# Patient Record
Sex: FEMALE | Race: WHITE | NOT HISPANIC OR LATINO
[De-identification: names, ages, dates, MRNs, and addresses within clinical notes are randomized per-mention and may not be internally consistent; named-entity substitution may affect disease eponyms.]

---

## 2021-02-10 ENCOUNTER — APPOINTMENT (OUTPATIENT)
Dept: PLASTIC SURGERY | Facility: CLINIC | Age: 81
End: 2021-02-10
Payer: MEDICARE

## 2021-02-10 VITALS
HEIGHT: 55 IN | HEART RATE: 64 BPM | TEMPERATURE: 97.9 F | WEIGHT: 128 LBS | SYSTOLIC BLOOD PRESSURE: 140 MMHG | OXYGEN SATURATION: 99 % | DIASTOLIC BLOOD PRESSURE: 90 MMHG | RESPIRATION RATE: 20 BRPM | BODY MASS INDEX: 29.62 KG/M2

## 2021-02-10 DIAGNOSIS — L82.1 OTHER SEBORRHEIC KERATOSIS: ICD-10-CM

## 2021-02-10 DIAGNOSIS — Z87.09 PERSONAL HISTORY OF OTHER DISEASES OF THE RESPIRATORY SYSTEM: ICD-10-CM

## 2021-02-10 DIAGNOSIS — Z86.79 PERSONAL HISTORY OF OTHER DISEASES OF THE CIRCULATORY SYSTEM: ICD-10-CM

## 2021-02-10 DIAGNOSIS — Z78.9 OTHER SPECIFIED HEALTH STATUS: ICD-10-CM

## 2021-02-10 DIAGNOSIS — Z87.891 PERSONAL HISTORY OF NICOTINE DEPENDENCE: ICD-10-CM

## 2021-02-10 PROBLEM — Z00.00 ENCOUNTER FOR PREVENTIVE HEALTH EXAMINATION: Status: ACTIVE | Noted: 2021-02-10

## 2021-02-10 PROCEDURE — 99203 OFFICE O/P NEW LOW 30 MIN: CPT

## 2021-02-10 PROCEDURE — 99072 ADDL SUPL MATRL&STAF TM PHE: CPT

## 2021-02-10 RX ORDER — BUDESONIDE AND FORMOTEROL FUMARATE DIHYDRATE 80; 4.5 UG/1; UG/1
80-4.5 AEROSOL RESPIRATORY (INHALATION)
Refills: 0 | Status: ACTIVE | COMMUNITY

## 2021-02-10 RX ORDER — APIXABAN 2.5 MG/1
2.5 TABLET, FILM COATED ORAL
Refills: 0 | Status: ACTIVE | COMMUNITY

## 2021-02-10 RX ORDER — METHOTREXATE 2.5 MG/1
2.5 TABLET ORAL
Refills: 0 | Status: ACTIVE | COMMUNITY

## 2021-02-10 NOTE — HISTORY OF PRESENT ILLNESS
[FreeTextEntry1] : Patient describes a forehead mass present for a number of years but recently increased in size.  She denies any history of trauma or infection.  She has no history of skin cancer, and no known family history of same.  \par She has a history of atrial fibrillation and is on Eliquis\par She quit smoking in 2000

## 2021-02-10 NOTE — REASON FOR VISIT
[Consultation] : a consultation visit [FreeTextEntry1] : Patient referred by her dermatologist for treatment of a cystic mass on the forehead

## 2021-04-16 ENCOUNTER — APPOINTMENT (OUTPATIENT)
Dept: PLASTIC SURGERY | Facility: CLINIC | Age: 81
End: 2021-04-16
Payer: MEDICARE

## 2021-04-16 PROCEDURE — 99072 ADDL SUPL MATRL&STAF TM PHE: CPT

## 2021-04-16 PROCEDURE — 14040 TIS TRNFR F/C/C/M/N/A/G/H/F: CPT

## 2021-04-18 NOTE — REASON FOR VISIT
[Procedure: _________] : a [unfilled] procedure visit [FreeTextEntry1] : Patient returns for excisional biopsy of the cystic lesion on her forehead

## 2021-04-18 NOTE — PROCEDURE
[FreeTextEntry1] : Cystic lesion left forehead [FreeTextEntry2] : Excisional biopsy of left forehead cyst  [FreeTextEntry4] : 20 cc  [FreeTextEntry3] : Xylocaine 1% [FreeTextEntry5] : none  [FreeTextEntry6] : The lesion on the forehead was marked for excision with the patient in agreement.  FOllowing a sterile prep and drape, Xylocaine with epinephrine was injected for local anesthesia.  Incision was made as marked and the lesion was excised.  Hemostasis was assured, and then flaps were undermined, advanced, and inset with nylon sutures, and sterile dressing placed.  Patient tolerated well.   [FreeTextEntry7] : skin and cystic mass, forehead

## 2021-04-21 ENCOUNTER — APPOINTMENT (OUTPATIENT)
Dept: PLASTIC SURGERY | Facility: CLINIC | Age: 81
End: 2021-04-21
Payer: MEDICARE

## 2021-04-21 DIAGNOSIS — L72.0 EPIDERMAL CYST: ICD-10-CM

## 2021-04-21 PROCEDURE — 99024 POSTOP FOLLOW-UP VISIT: CPT

## 2021-04-21 NOTE — ASSESSMENT
[FreeTextEntry1] : A:\par Doing well post operative\par P:\par Sutures removed and scar care reviewed\par Pathology reported as pending \par will call with results

## 2021-04-21 NOTE — REASON FOR VISIT
[Post Op: _________] : a [unfilled] post op visit [FreeTextEntry1] : Ms. DUGLAS GODNIEZ  returns for suture removal, generally doing well with no complaints and no fevers or chills at home.

## 2021-10-25 ENCOUNTER — APPOINTMENT (OUTPATIENT)
Dept: PLASTIC SURGERY | Facility: CLINIC | Age: 81
End: 2021-10-25
Payer: MEDICARE

## 2021-10-25 VITALS
DIASTOLIC BLOOD PRESSURE: 74 MMHG | RESPIRATION RATE: 20 BRPM | OXYGEN SATURATION: 96 % | HEART RATE: 53 BPM | SYSTOLIC BLOOD PRESSURE: 135 MMHG

## 2021-10-25 DIAGNOSIS — D48.5 NEOPLASM OF UNCERTAIN BEHAVIOR OF SKIN: ICD-10-CM

## 2021-10-25 PROCEDURE — 99212 OFFICE O/P EST SF 10 MIN: CPT

## 2021-10-25 NOTE — PHYSICAL EXAM
[NI] : Normal [de-identified] : 1 cm raised excoriated lesion mid forehead\par no surrounding erythema or purulence\par no palpable adenopathy

## 2021-10-25 NOTE — ASSESSMENT
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 Marcos for evaluation and management

## 2021-10-25 NOTE — REASON FOR VISIT
[Consultation] : a consultation visit [FreeTextEntry1] : Patient returns with a new rapidly growing lesion on the mid forehead

## 2021-11-29 ENCOUNTER — APPOINTMENT (OUTPATIENT)
Dept: BREAST CENTER | Facility: CLINIC | Age: 81
End: 2021-11-29

## 2021-11-29 ENCOUNTER — NON-APPOINTMENT (OUTPATIENT)
Age: 81
End: 2021-11-29

## 2021-11-29 VITALS — HEIGHT: 62 IN | WEIGHT: 114 LBS | BODY MASS INDEX: 20.98 KG/M2

## 2021-12-13 ENCOUNTER — RESULT REVIEW (OUTPATIENT)
Age: 81
End: 2021-12-13

## 2021-12-14 ENCOUNTER — APPOINTMENT (OUTPATIENT)
Dept: PLASTIC SURGERY | Facility: CLINIC | Age: 81
End: 2021-12-14
Payer: MEDICARE

## 2021-12-14 VITALS — RESPIRATION RATE: 16 BRPM | TEMPERATURE: 97.8 F | BODY MASS INDEX: 20.98 KG/M2 | HEIGHT: 62 IN | WEIGHT: 114 LBS

## 2021-12-14 DIAGNOSIS — L98.9 DISORDER OF THE SKIN AND SUBCUTANEOUS TISSUE, UNSPECIFIED: ICD-10-CM

## 2021-12-14 PROCEDURE — 13132 CMPLX RPR F/C/C/M/N/AX/G/H/F: CPT

## 2021-12-14 PROCEDURE — 11644 EXC F/E/E/N/L MAL+MRG 3.1-4: CPT

## 2021-12-14 NOTE — PROCEDURE
[FreeTextEntry1] : Forehead skin lesion [FreeTextEntry2] : Forehead Excision [FreeTextEntry6] : - 4cc (1% Lidocaine with 1:100,000 Epinephrine) injected in a area surrounding lesion.\par - Skin prepped and draped in sterile fashion\par - 2.5 x 2.5 cm lesion excised as a 2.7x4 cm ellipse\par - wound edges undermined to achieve a tension free closure\par - Hemostasis achieved\par - Wound closed with 4-0 Monocryl and 6-0 Nylon, total length of closure\par - Patient tolerated procedure well\par \par Tissue sent for pathology review\par \par  [FreeTextEntry7] : Forehead Skin Lesion

## 2021-12-14 NOTE — HISTORY OF PRESENT ILLNESS
[FreeTextEntry1] : Patient is a 81 year old Female presents for initial consultation for new rapidly growing lesion on the mid forehead. Started 3 months ago, began small for a month and grew rapidly and last month has been this size, 2.5 cm. States it's itchy, but not painful. Denies any bleeding or fluid from lesion. \par \par PE:\par 2.5 x 2.5 cm round, elevated browinish/flesh colored mass mid forehead.\par \par A/P:\par - Skin excision procedure in office today\par - Tissue Biopsy order placed\par - Wound care and activity limitations reviewed\par - Follow-up in 1 week for suture removal and pathology results

## 2021-12-20 ENCOUNTER — RESULT REVIEW (OUTPATIENT)
Age: 81
End: 2021-12-20

## 2021-12-21 ENCOUNTER — APPOINTMENT (OUTPATIENT)
Dept: PLASTIC SURGERY | Facility: CLINIC | Age: 81
End: 2021-12-21
Payer: MEDICARE

## 2021-12-21 PROCEDURE — 13132 CMPLX RPR F/C/C/M/N/AX/G/H/F: CPT | Mod: 58

## 2021-12-21 PROCEDURE — 11646 EXC F/E/E/N/L MAL+MRG >4 CM: CPT | Mod: 58

## 2021-12-21 NOTE — HISTORY OF PRESENT ILLNESS
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 0.5 mm margin x 7 cm long.\par Skin prepped and draped in sterile fashion.\par Skin margin excised.\par Wound edges undermined to achieve tension free closure.\par Skin closed with multiple buried 4-0 monocryl sutures, and superficial 6-0 nylon suture.\par Tolerated well.\par Wound care and activity limitations reviewed.\par Tissue sent for pathology review (left side of specimen marked with long suture)\par Follow up next week for suture removal.\par

## 2021-12-28 ENCOUNTER — APPOINTMENT (OUTPATIENT)
Dept: PLASTIC SURGERY | Facility: CLINIC | Age: 81
End: 2021-12-28
Payer: MEDICARE

## 2021-12-28 PROCEDURE — 99024 POSTOP FOLLOW-UP VISIT: CPT

## 2022-01-07 NOTE — HISTORY OF PRESENT ILLNESS
[FreeTextEntry1] : 79 y/o female presents 7 days s/p re-excision of forehead skin lesion.\par Previously has 1 mm clear margin, and 4-5 mm margin of re excision.\par \par Healing well, sutures removed.\par Follow up in 6 weeks to reassess healing.\par Addendum: Surgical Pathology Report collected 12/21/21 - Negative for tumor. No evidence of residual squamous cell carcinoma.

## 2022-01-23 NOTE — PHYSICAL EXAM
[NI] : Normal [de-identified] : 2 cm fimr mobile subcutaneous mass, no surrounding erythema or purulence and no palpable adenopathy  [de-identified] : 1 cm whitish plaque-like lesion on the dorsum of the left hand \par no surrounding erythema or purulence\par no palpable adenopathy  negative

## 2023-01-04 NOTE — ASSESSMENT
[FreeTextEntry1] : A:\par Cystic lesion forehead\par P:\par Excisional biopsy and flap closure of forehead cyst\par tolerated well\par instructions reviewed  Gabapentin Counseling: I discussed with the patient the risks of gabapentin including but not limited to dizziness, somnolence, fatigue and ataxia.

## 2024-11-08 NOTE — ASSESSMENT
[FreeTextEntry1] : A:\par Cystic mass forehead\par keratosis left hand\par P:\par We discussed the treatment options at some length and favor excision if cleared by her Ophthalmologist to temporarily suspend Eliquis therapy.  Reviewed the material risks,  benefits,  and alternatives with Ms. lindysravani galvez  , including no surgery, and she  understands and wishes to proceed.\par 
Opt out